# Patient Record
Sex: FEMALE | Race: WHITE
[De-identification: names, ages, dates, MRNs, and addresses within clinical notes are randomized per-mention and may not be internally consistent; named-entity substitution may affect disease eponyms.]

---

## 2019-12-10 ENCOUNTER — HOSPITAL ENCOUNTER (OUTPATIENT)
Dept: HOSPITAL 5 - XRAY | Age: 54
Discharge: HOME | End: 2019-12-10
Attending: ORTHOPAEDIC SURGERY
Payer: COMMERCIAL

## 2019-12-10 DIAGNOSIS — M76.9: Primary | ICD-10-CM

## 2019-12-10 PROCEDURE — 73565 X-RAY EXAM OF KNEES: CPT

## 2019-12-10 NOTE — XRAY REPORT
Standing bilateral AP knees, 2 views



INDICATION: Left knee pain



FINDINGS: On the left side there is moderate narrowing of the medial compartment with lateral compart
ment intact. There is only minimal spurring on the left. On the right side the medial compartment is 
maintained. There is slight to moderate narrowing of the lateral compartment again with small amount 
of associated spurring.



Signer Name: Cyrus Kaur MD 

Signed: 12/10/2019 12:40 PM

 Workstation Name: [id number]

## 2019-12-10 NOTE — XRAY REPORT
See earlier report.



Signer Name: Cyrus Kaur MD 

Signed: 12/10/2019 12:42 PM

 Workstation Name: [id number]